# Patient Record
(demographics unavailable — no encounter records)

---

## 2025-01-13 NOTE — HISTORY OF PRESENT ILLNESS
[Sudden] : sudden [5] : 5 [2] : 2 [Sharp] : sharp [Occasional] : occasional [Ice] : ice [de-identified] : This is Ms. UMER CARREON  a 24 year old female who comes in today complaining of right knee pain since falling while skiing on 12/29/24.  the injury put a valgus force on the knee. painful and swollen. feels unstable, + buckling. difficulty w stairs.    works as  [] : no [FreeTextEntry3] : 12/29/24 [de-identified] : urgent care [de-identified] : xray

## 2025-01-13 NOTE — IMAGING
[Right] : right knee [All Views] : anteroposterior, lateral, skyline, and anteroposterior standing [There are no fractures, subluxations or dislocations. No significant abnormalities are seen] : There are no fractures, subluxations or dislocations. No significant abnormalities are seen [de-identified] :   ----------------------------------------------------------------------------   Right knee exam:   Skin: no significant pertinent finding  Inspection:     (+) Effusion     (+valgus) Malalignment     (neg) Swelling     (neg) Quad atrophy     (neg) J-sign  ROM:     3 - 100 degrees of flexion.  Tenderness:     (neg) MJLT     (neg) LJLT     (neg) Medial patellar facet tenderness     (neg) Lateral patellar facet tenderness     (neg) Crepitus     (neg) Patellar grind tenderness     (neg) Patellar tendon     (neg) Quad tendon     Other:  Stability:     (+) Lachman     (neg) Varus/Valgus instability     (neg) Posterior drawer     (neg) Patellar translation: wnl  Additional tests:     (+) McMurrays test     (neg) Patellar apprehension     Other:  Strength: 5/5 Q/H/TA/GS/EHL  Neuro: In tact to light touch throughout, DTR's wnl  Vascularity: Extremity warm and well perfused  Gait: min antalgic

## 2025-01-13 NOTE — DISCUSSION/SUMMARY
[de-identified] : Discussed options, Obtain MRI right knee eval ACL rupture Recommend bracing, rx functional ACL brace PT rx prehab f/u p MRI  ----------------------------------------------------------------------------   All relevant imaging studies pertinent to today's visit, including x-rays, MRI's and/or other advanced imaging studies (CT/etc) were independently interpreted and reviewed with the patient as needed. Implications of the studies together with the patient's clinical picture were discussed to formulate a working diagnosis and management options were detailed.   The patient and/or guardian was advised of the diagnosis.  The natural history of the pathology was explained in full. All questions were answered.  The risks and benefits of conservative and interventional treatment alternatives were explained to the patient   The patient and/or guardian was advised if any advanced diagnostic/imaging study (MRI/CT/etc) is ordered to evaluate potential pathology in the affected area(s), they should follow up in the office to review the results of the study and determine further management that may be indicated.

## 2025-02-05 NOTE — HISTORY OF PRESENT ILLNESS
[Sudden] : sudden [4] : 4 [0] : 0 [Dull/Aching] : dull/aching [Sharp] : sharp [Occasional] : occasional [Ice] : ice [] : yes

## 2025-03-14 NOTE — DATA REVIEWED
[MRI] : MRI [Right] : of the right [I independently reviewed and interpreted images and report] : I independently reviewed and interpreted images and report [FreeTextEntry1] : complete ACL rupture, large effusion

## 2025-03-14 NOTE — IMAGING
[Right] : right knee [All Views] : anteroposterior, lateral, skyline, and anteroposterior standing [There are no fractures, subluxations or dislocations. No significant abnormalities are seen] : There are no fractures, subluxations or dislocations. No significant abnormalities are seen [de-identified] :   ----------------------------------------------------------------------------   Right knee exam:   incision CDI mild effusion good rom NVI  sutures removed, steri strips applied   [Tunnels and hardware in proper position] : Tunnels and hardware in proper position

## 2025-03-14 NOTE — HISTORY OF PRESENT ILLNESS
[Sudden] : sudden [Result of repetitive motion] : result of repetitive motion [4] : 4 [5] : 5 [Dull/Aching] : dull/aching [Sharp] : sharp [Occasional] : occasional [Ice] : ice [Not working due to injury] : Work status: not working due to injury [de-identified] : This is Ms. UMER CARREON  a 24 year old female who comes in today complaining of right knee pain since falling while skiing on 12/29/24.  the injury put a valgus force on the knee. painful and swollen. feels unstable, + buckling. difficulty w stairs.    works as    s/p R knee ACL recon with BTB autograft (DOS 3/14/25) [] : This patient has had an injection before: no [FreeTextEntry3] : 12/29/24 [FreeTextEntry5] : No fever no chills, wearing ACL brace [de-identified] : urgent care [de-identified] : xray [de-identified] : PT/MRI [de-identified] :  [de-identified] : 03/04/25 [de-identified] : ACL right knee

## 2025-03-14 NOTE — DISCUSSION/SUMMARY
[de-identified] :  Discussed proper post-op protocol and activity modifications Plan to start PT  WB as tolerated with brace f/u 4 weeks ----------------------------------------------------------------------------   All relevant imaging studies pertinent to today's visit, including x-rays, MRI's and/or other advanced imaging studies (CT/etc) were independently interpreted and reviewed with the patient as needed. Implications of the studies together with the patient's clinical picture were discussed to formulate a working diagnosis and management options were detailed.   The patient and/or guardian was advised of the diagnosis.  The natural history of the pathology was explained in full. All questions were answered.  The risks and benefits of conservative and interventional treatment alternatives were explained to the patient   The patient and/or guardian was advised if any advanced diagnostic/imaging study (MRI/CT/etc) is ordered to evaluate potential pathology in the affected area(s), they should follow up in the office to review the results of the study and determine further management that may be indicated.

## 2025-04-11 NOTE — DISCUSSION/SUMMARY
[de-identified] : Discussed proper post-op protocol and activity modifications continue PT  WB as tolerated with brace remain oow x 2 wks then light duty  f/u 6 wks  ----------------------------------------------------------------------------   All relevant imaging studies pertinent to today's visit, including x-rays, MRI's and/or other advanced imaging studies (CT/etc) were independently interpreted and reviewed with the patient as needed. Implications of the studies together with the patient's clinical picture were discussed to formulate a working diagnosis and management options were detailed.   The patient and/or guardian was advised of the diagnosis.  The natural history of the pathology was explained in full. All questions were answered.  The risks and benefits of conservative and interventional treatment alternatives were explained to the patient   The patient and/or guardian was advised if any advanced diagnostic/imaging study (MRI/CT/etc) is ordered to evaluate potential pathology in the affected area(s), they should follow up in the office to review the results of the study and determine further management that may be indicated.

## 2025-04-11 NOTE — HISTORY OF PRESENT ILLNESS
[Sudden] : sudden [Result of repetitive motion] : result of repetitive motion [0] : 0 [Not working due to injury] : Work status: not working due to injury [de-identified] : This is Ms. UMER CARREON  a 24-year-old female who comes in today complaining of right knee pain since falling while skiing on 12/29/24.  the injury put a valgus force on the knee. painful and swollen. feels unstable, + buckling. difficulty w stairs.    works as    s/p R knee ACL recon with BTB autograft (DOS 3/4/25) [] : This patient has had an injection before: no [FreeTextEntry3] : 12/29/24 [FreeTextEntry5] : No fever no chills, wearing ACL brace [de-identified] : urgent care [de-identified] : xray [de-identified] : PT [de-identified] :  [de-identified] : 03/04/25 [de-identified] : ACL right knee

## 2025-04-11 NOTE — IMAGING
[Right] : right knee [All Views] : anteroposterior, lateral, skyline, and anteroposterior standing [There are no fractures, subluxations or dislocations. No significant abnormalities are seen] : There are no fractures, subluxations or dislocations. No significant abnormalities are seen [Tunnels and hardware in proper position] : Tunnels and hardware in proper position [de-identified] :   ----------------------------------------------------------------------------   Right knee exam:   incision healed  min effusion 3- 95 NVI Lachman - good endpoint

## 2025-04-11 NOTE — REASON FOR VISIT
[FreeTextEntry2] : 2nd post op right knee. doing well in PT. pain minimal. is still oow  dos: 3/4/25

## 2025-05-30 NOTE — IMAGING
[Right] : right knee [All Views] : anteroposterior, lateral, skyline, and anteroposterior standing [There are no fractures, subluxations or dislocations. No significant abnormalities are seen] : There are no fractures, subluxations or dislocations. No significant abnormalities are seen [Tunnels and hardware in proper position] : Tunnels and hardware in proper position [de-identified] :   ----------------------------------------------------------------------------   Right knee exam:   incision healed  no effusion 2 - 120 NVI Lachman - good endpoint

## 2025-05-30 NOTE — HISTORY OF PRESENT ILLNESS
[Sudden] : sudden [Result of repetitive motion] : result of repetitive motion [4] : 4 [Not working due to injury] : Work status: not working due to injury [de-identified] : This is Ms. UMER CARREON  a 24-year-old female who comes in today complaining of right knee pain since falling while skiing on 12/29/24.  the injury put a valgus force on the knee. painful and swollen. feels unstable, + buckling. difficulty w stairs.    works as    s/p R knee ACL recon with BTB autograft (DOS 3/4/25) [] : This patient has had an injection before: no [FreeTextEntry3] : 12/29/24 [FreeTextEntry5] : No fever no chills, wearing ACL brace [de-identified] : urgent care [de-identified] : xray [de-identified] : PT [de-identified] : 03/04/25 [de-identified] :  [de-identified] : ACL right knee

## 2025-05-30 NOTE — DISCUSSION/SUMMARY
[de-identified] : Discussed proper post-op protocol and activity modifications continue PT  light duty  naproxen 500 mg 3-4 days then prn f/u 8 wks  ----------------------------------------------------------------------------   All relevant imaging studies pertinent to today's visit, including x-rays, MRI's and/or other advanced imaging studies (CT/etc) were independently interpreted and reviewed with the patient as needed. Implications of the studies together with the patient's clinical picture were discussed to formulate a working diagnosis and management options were detailed.   The patient and/or guardian was advised of the diagnosis.  The natural history of the pathology was explained in full. All questions were answered.  The risks and benefits of conservative and interventional treatment alternatives were explained to the patient   The patient and/or guardian was advised if any advanced diagnostic/imaging study (MRI/CT/etc) is ordered to evaluate potential pathology in the affected area(s), they should follow up in the office to review the results of the study and determine further management that may be indicated.      ----------------------------------------------------------------------------   Patient warned of specific risks of medication related to bleeding, GI issues, increase blood pressure, and cardiac risks in addition to additional risks.  Patient advised to discuss with PMD  if any presence of stated issues.

## 2025-05-30 NOTE — REASON FOR VISIT
[FreeTextEntry2] : 3rd post op right knee. doing well. having anterior discomfort. some difficulty with lots of walking / stairs  dos: 3/4/25

## 2025-07-18 NOTE — REASON FOR VISIT
[FreeTextEntry2] : follow up right knee. Reports achiness and going to PT and having trouble with extension strength dos: 3/4/25

## 2025-07-18 NOTE — DISCUSSION/SUMMARY
[de-identified] : continue PT - continue to jogging Discuss activity modifications- limit [employer] light duty for work Rx functional acl brace for pivoting in PT f/u 2-3 months ----------------------------------------------------------------------------   All relevant imaging studies pertinent to today's visit, including x-rays, MRI's and/or other advanced imaging studies (CT/etc) were independently interpreted and reviewed with the patient as needed. Implications of the studies together with the patient's clinical picture were discussed to formulate a working diagnosis and management options were detailed.   The patient and/or guardian was advised of the diagnosis.  The natural history of the pathology was explained in full. All questions were answered.  The risks and benefits of conservative and interventional treatment alternatives were explained to the patient   The patient and/or guardian was advised if any advanced diagnostic/imaging study (MRI/CT/etc) is ordered to evaluate potential pathology in the affected area(s), they should follow up in the office to review the results of the study and determine further management that may be indicated.      ----------------------------------------------------------------------------   Patient warned of specific risks of medication related to bleeding, GI issues, increase blood pressure, and cardiac risks in addition to additional risks.  Patient advised to discuss with PMD  if any presence of stated issues.

## 2025-07-18 NOTE — IMAGING
[Right] : right knee [All Views] : anteroposterior, lateral, skyline, and anteroposterior standing [There are no fractures, subluxations or dislocations. No significant abnormalities are seen] : There are no fractures, subluxations or dislocations. No significant abnormalities are seen [Tunnels and hardware in proper position] : Tunnels and hardware in proper position [de-identified] :   ----------------------------------------------------------------------------   Right knee exam:   incision healed  no effusion 2 - 120 NVI Lachman - good endpoint

## 2025-07-18 NOTE — HISTORY OF PRESENT ILLNESS
[Sudden] : sudden [Result of repetitive motion] : result of repetitive motion [3] : 3 [Not working due to injury] : Work status: not working due to injury [de-identified] : This is Ms. UMER CARREON  a 24-year-old female who comes in today complaining of right knee pain since falling while skiing on 12/29/24.  the injury put a valgus force on the knee. painful and swollen. feels unstable, + buckling. difficulty w stairs.    works as    s/p R knee ACL recon with BTB autograft (DOS 3/4/25) [] : This patient has had an injection before: no [FreeTextEntry3] : 12/29/24 [FreeTextEntry5] : No fever no chills, wearing ACL brace [de-identified] : urgent care [de-identified] : xray [de-identified] : PT [de-identified] :  [de-identified] : 03/04/25 [de-identified] : ACL right knee